# Patient Record
Sex: MALE | Race: BLACK OR AFRICAN AMERICAN | Employment: PART TIME | URBAN - METROPOLITAN AREA
[De-identification: names, ages, dates, MRNs, and addresses within clinical notes are randomized per-mention and may not be internally consistent; named-entity substitution may affect disease eponyms.]

---

## 2020-01-10 ENCOUNTER — HOSPITAL ENCOUNTER (EMERGENCY)
Facility: CLINIC | Age: 45
Discharge: HOME | End: 2020-01-10
Attending: EMERGENCY MEDICINE | Admitting: EMERGENCY MEDICINE
Payer: COMMERCIAL

## 2020-01-10 ENCOUNTER — APPOINTMENT (OUTPATIENT)
Dept: GENERAL RADIOLOGY | Facility: CLINIC | Age: 45
End: 2020-01-10
Attending: EMERGENCY MEDICINE
Payer: COMMERCIAL

## 2020-01-10 VITALS
WEIGHT: 163.8 LBS | RESPIRATION RATE: 16 BRPM | DIASTOLIC BLOOD PRESSURE: 98 MMHG | HEIGHT: 66 IN | BODY MASS INDEX: 26.33 KG/M2 | SYSTOLIC BLOOD PRESSURE: 141 MMHG | TEMPERATURE: 98 F | HEART RATE: 74 BPM | OXYGEN SATURATION: 99 %

## 2020-01-10 DIAGNOSIS — I10 HYPERTENSION, UNSPECIFIED TYPE: ICD-10-CM

## 2020-01-10 LAB
ALBUMIN SERPL-MCNC: 4 G/DL (ref 3.4–5)
ALP SERPL-CCNC: 88 U/L (ref 40–150)
ALT SERPL W P-5'-P-CCNC: 41 U/L (ref 0–70)
ANION GAP SERPL CALCULATED.3IONS-SCNC: 4 MMOL/L (ref 3–14)
AST SERPL W P-5'-P-CCNC: 23 U/L (ref 0–45)
BASOPHILS # BLD AUTO: 0 10E9/L (ref 0–0.2)
BASOPHILS NFR BLD AUTO: 0.4 %
BILIRUB SERPL-MCNC: 0.7 MG/DL (ref 0.2–1.3)
BUN SERPL-MCNC: 10 MG/DL (ref 7–30)
CALCIUM SERPL-MCNC: 8.4 MG/DL (ref 8.5–10.1)
CHLORIDE SERPL-SCNC: 106 MMOL/L (ref 94–109)
CO2 SERPL-SCNC: 30 MMOL/L (ref 20–32)
CREAT SERPL-MCNC: 0.78 MG/DL (ref 0.66–1.25)
DIFFERENTIAL METHOD BLD: NORMAL
EOSINOPHIL # BLD AUTO: 0 10E9/L (ref 0–0.7)
EOSINOPHIL NFR BLD AUTO: 0.6 %
ERYTHROCYTE [DISTWIDTH] IN BLOOD BY AUTOMATED COUNT: 12.8 % (ref 10–15)
GFR SERPL CREATININE-BSD FRML MDRD: >90 ML/MIN/{1.73_M2}
GLUCOSE SERPL-MCNC: 88 MG/DL (ref 70–99)
HCT VFR BLD AUTO: 43.6 % (ref 40–53)
HGB BLD-MCNC: 15.1 G/DL (ref 13.3–17.7)
IMM GRANULOCYTES # BLD: 0 10E9/L (ref 0–0.4)
IMM GRANULOCYTES NFR BLD: 0.2 %
LYMPHOCYTES # BLD AUTO: 1.5 10E9/L (ref 0.8–5.3)
LYMPHOCYTES NFR BLD AUTO: 32.3 %
MCH RBC QN AUTO: 29.5 PG (ref 26.5–33)
MCHC RBC AUTO-ENTMCNC: 34.6 G/DL (ref 31.5–36.5)
MCV RBC AUTO: 85 FL (ref 78–100)
MONOCYTES # BLD AUTO: 0.3 10E9/L (ref 0–1.3)
MONOCYTES NFR BLD AUTO: 6.6 %
NEUTROPHILS # BLD AUTO: 2.8 10E9/L (ref 1.6–8.3)
NEUTROPHILS NFR BLD AUTO: 59.9 %
NRBC # BLD AUTO: 0 10*3/UL
NRBC BLD AUTO-RTO: 0 /100
PLATELET # BLD AUTO: 181 10E9/L (ref 150–450)
POTASSIUM SERPL-SCNC: 4.5 MMOL/L (ref 3.4–5.3)
PROT SERPL-MCNC: 7.4 G/DL (ref 6.8–8.8)
RBC # BLD AUTO: 5.11 10E12/L (ref 4.4–5.9)
SODIUM SERPL-SCNC: 140 MMOL/L (ref 133–144)
TROPONIN I BLD-MCNC: 0 UG/L (ref 0–0.08)
TSH SERPL DL<=0.005 MIU/L-ACNC: 1.05 MU/L (ref 0.4–4)
WBC # BLD AUTO: 4.7 10E9/L (ref 4–11)

## 2020-01-10 PROCEDURE — 71046 X-RAY EXAM CHEST 2 VIEWS: CPT

## 2020-01-10 PROCEDURE — 84484 ASSAY OF TROPONIN QUANT: CPT

## 2020-01-10 PROCEDURE — 93005 ELECTROCARDIOGRAM TRACING: CPT | Performed by: EMERGENCY MEDICINE

## 2020-01-10 PROCEDURE — 99284 EMERGENCY DEPT VISIT MOD MDM: CPT | Mod: Z6 | Performed by: EMERGENCY MEDICINE

## 2020-01-10 PROCEDURE — 85025 COMPLETE CBC W/AUTO DIFF WBC: CPT | Performed by: EMERGENCY MEDICINE

## 2020-01-10 PROCEDURE — 84443 ASSAY THYROID STIM HORMONE: CPT | Performed by: EMERGENCY MEDICINE

## 2020-01-10 PROCEDURE — 99285 EMERGENCY DEPT VISIT HI MDM: CPT | Mod: 25 | Performed by: EMERGENCY MEDICINE

## 2020-01-10 PROCEDURE — 80053 COMPREHEN METABOLIC PANEL: CPT | Performed by: EMERGENCY MEDICINE

## 2020-01-10 SDOH — HEALTH STABILITY: MENTAL HEALTH: HOW OFTEN DO YOU HAVE A DRINK CONTAINING ALCOHOL?: NEVER

## 2020-01-10 ASSESSMENT — ENCOUNTER SYMPTOMS
VOMITING: 0
DIZZINESS: 0
HEADACHES: 0
HYPERTENSION: 1
NAUSEA: 0
CONFUSION: 0
SHORTNESS OF BREATH: 0
WEAKNESS: 0

## 2020-01-10 ASSESSMENT — MIFFLIN-ST. JEOR: SCORE: 1577.8

## 2020-01-10 NOTE — ED TRIAGE NOTES
Seen at clinic 3 days ago. Told to watch BP at home twice daily. Diastolic readings have always been elevated, today diastolic reading at 110. Patient has not started any HTN Rx yet. Would like blood tests.

## 2020-01-10 NOTE — ED PROVIDER NOTES
[facility] EMERGENCY DEPARTMENT ([facility])    1/10/20     ED 2 1:28 PM   History     Chief Complaint   Patient presents with     Hypertension     Seen at clinic 3 days ago. Told to watch BP at home twice daily. Diastolic readings have always been elevated, today diastolic reading at 110. Patient has not started any HTN Rx yet. Would like blood tests.      The history is provided by the patient, medical records and the spouse.     Rohit Bianchi is a 44 year old male who presents with increasing diastolic blood pressures. Patient went to clinic last week and was told that his diastolic pressures have been high. They told patient to trend his blood pressures at home, to change diet to a low sodium diet and exercise. They did not see a doctor, just was seen by clinic staff who checked blood pressure and no labs were done.   Wife has been trending his blood pressures at home over the past few days and noticed his diastolic pressures were 110 at home. She brought him in for evaluation.  Wife states that at home his diastolic pressures have been creeping upwards and this concerned her.  He has not been started on any antihypertension medications. No lightheadedness, dizziness, headache, unilateral weakness. He has some left sided chest discomfort, not sure if this is in chest wall. He notes he sleeps on left side sometimes and can feel achy with this.  No shortness of breath. Patient does not have a primary care doctor, but does have a clinic.     I have reviewed the Medications, Allergies, Past Medical and Surgical History, and Social History in the [employer] system.  History reviewed. No pertinent past medical history.    History reviewed. No pertinent surgical history.    History reviewed. No pertinent family history.    Social History     Tobacco Use     Smoking status: Never Smoker     Smokeless tobacco: Never Used   Substance Use Topics     Alcohol use: Never     Frequency: Never      Review of Systems  "  Respiratory: Negative for shortness of breath.    Cardiovascular: Positive for chest pain (Left chest wall pain). Negative for leg swelling.   Gastrointestinal: Negative for nausea and vomiting.   Neurological: Negative for dizziness, weakness and headaches.   Psychiatric/Behavioral: Negative for confusion.       Physical Exam   BP: (!) 155/96  Pulse: 76  Heart Rate: 74  Temp: 95.8  F (35.4  C)  Resp: 16  Height: 166.4 cm (5' 5.5\")  Weight: 74.3 kg (163 lb 12.8 oz)  SpO2: 98 %      Physical Exam  Vitals signs and nursing note reviewed.   Constitutional:       General: He is not in acute distress.     Appearance: Normal appearance. He is not diaphoretic.   HENT:      Head: Atraumatic.   Eyes:      General: No scleral icterus.     Pupils: Pupils are equal, round, and reactive to light.   Neck:      Musculoskeletal: Normal range of motion and neck supple.   Cardiovascular:      Rate and Rhythm: Normal rate and regular rhythm.      Heart sounds: Normal heart sounds.   Pulmonary:      Effort: No respiratory distress.      Breath sounds: Normal breath sounds.   Abdominal:      General: Bowel sounds are normal.      Palpations: Abdomen is soft.      Tenderness: There is no abdominal tenderness.   Musculoskeletal:         General: No tenderness.   Skin:     General: Skin is warm.      Findings: No rash.   Neurological:      General: No focal deficit present.      Mental Status: He is alert and oriented to person, place, and time.         ED Course     1:28 PM patient assessed in ED 2 by Dr. Scott    Results for orders placed or performed during the hospital encounter of 01/10/20 (from the past 24 hour(s))   EKG 12-lead, tracing only   Result Value Ref Range    Interpretation ECG Click View Image link to view waveform and result    Troponin POCT   Result Value Ref Range    Troponin I 0.00 0.00 - 0.08 ug/L   CBC with platelets differential   Result Value Ref Range    WBC 4.7 4.0 - 11.0 10e9/L    RBC Count 5.11 4.4 - 5.9 " 10e12/L    Hemoglobin 15.1 13.3 - 17.7 g/dL    Hematocrit 43.6 40.0 - 53.0 %    MCV 85 78 - 100 fl    MCH 29.5 26.5 - 33.0 pg    MCHC 34.6 31.5 - 36.5 g/dL    RDW 12.8 10.0 - 15.0 %    Platelet Count 181 150 - 450 10e9/L    Diff Method Automated Method     % Neutrophils 59.9 %    % Lymphocytes 32.3 %    % Monocytes 6.6 %    % Eosinophils 0.6 %    % Basophils 0.4 %    % Immature Granulocytes 0.2 %    Nucleated RBCs 0 0 /100    Absolute Neutrophil 2.8 1.6 - 8.3 10e9/L    Absolute Lymphocytes 1.5 0.8 - 5.3 10e9/L    Absolute Monocytes 0.3 0.0 - 1.3 10e9/L    Absolute Eosinophils 0.0 0.0 - 0.7 10e9/L    Absolute Basophils 0.0 0.0 - 0.2 10e9/L    Abs Immature Granulocytes 0.0 0 - 0.4 10e9/L    Absolute Nucleated RBC 0.0    Comprehensive metabolic panel   Result Value Ref Range    Sodium 140 133 - 144 mmol/L    Potassium 4.5 3.4 - 5.3 mmol/L    Chloride 106 94 - 109 mmol/L    Carbon Dioxide 30 20 - 32 mmol/L    Anion Gap 4 3 - 14 mmol/L    Glucose 88 70 - 99 mg/dL    Urea Nitrogen 10 7 - 30 mg/dL    Creatinine 0.78 0.66 - 1.25 mg/dL    GFR Estimate >90 >60 mL/min/[1.73_m2]    GFR Estimate If Black >90 >60 mL/min/[1.73_m2]    Calcium 8.4 (L) 8.5 - 10.1 mg/dL    Bilirubin Total 0.7 0.2 - 1.3 mg/dL    Albumin 4.0 3.4 - 5.0 g/dL    Protein Total 7.4 6.8 - 8.8 g/dL    Alkaline Phosphatase 88 40 - 150 U/L    ALT 41 0 - 70 U/L    AST 23 0 - 45 U/L   TSH with free T4 reflex   Result Value Ref Range    TSH 1.05 0.40 - 4.00 mU/L   XR Chest 2 Views    Narrative    CHEST TWO VIEWS 1/10/2020 3:10 PM     HISTORY: Hypertension    COMPARISON: None.    FINDINGS: Heart size and pulmonary vascularity are within normal  limits. The lungs are clear. No pneumothorax or pleural effusion.       Impression    IMPRESSION: No radiographic evidence of acute chest abnormality.     ROSA M ASCENCIO MD     Medications - No data to display      Assessments & Plan (with Medical Decision Making)     4 year old male who presents with increasing diastolic  blood pressures.  Patient's blood pressures in the emergency department ranged from 155/96 to 146/101.  IV established, labs drawn sent reviewed document epic essentially all unremarkable including normal CBC, normal electrolytes, normal TSH, negative troponin.  EKG obtained which revealed no acute ischemia and normal sinus rhythm.  Patient given uppercase traction for hypertension and phone number to follow-up with [facility] to establish primary care.    I have reviewed the nursing notes.    I have reviewed the findings, diagnosis, plan and need for follow up with the patient.    New Prescriptions    No medications on file       Final diagnoses:   Hypertension, unspecified type   I, Yue Caldwell, am serving as a trained medical scribe to document services personally performed by Severino Scott MD based on the provider's statements to me on January 10, 2020.  This document has been checked and approved by the attending provider.    I, Severino Scott MD, was physically present and have reviewed and verified the accuracy of this note documented by Yue Caldwell, medical scribe.       1/10/2020   [facility], EMERGENCY DEPARTMENT     Severino Scott MD  01/14/20 0120

## 2020-01-10 NOTE — ED AVS SNAPSHOT
[facility], [place], Emergency Department  [address]  [facility] [place] [place] [address]  Phone:  [phone]  Fax:  [phone]                                    Rohit Bianchi   MRN: [id number]    Department:  [facility], Emergency Department   Date of Visit:  1/10/2020           After Visit Summary Signature Page    I have received my discharge instructions, and my questions have been answered. I have discussed any challenges I see with this plan with the nurse or doctor.    ..........................................................................................................................................  Patient/Patient Representative Signature      ..........................................................................................................................................  Patient Representative Print Name and Relationship to Patient    ..................................................               ................................................  Date                                   Time    ..........................................................................................................................................  Reviewed by Signature/Title    ...................................................              ..............................................  Date                                               Time          22EPIC Rev 08/18

## 2020-01-10 NOTE — ED NOTES
Patient alert/oriented. Stable on feet. AVS reviewed. Patient and spouse denies questions or concerns at discharge. Safe to discharge home.

## 2020-01-10 NOTE — DISCHARGE INSTRUCTIONS
Please make an appointment to follow up with [facility] (phone: [phone]) as soon as possible.

## 2020-01-13 LAB — INTERPRETATION ECG - MUSE: NORMAL

## 2021-04-03 ENCOUNTER — HOSPITAL ENCOUNTER (EMERGENCY)
Facility: CLINIC | Age: 46
Discharge: HOME | End: 2021-04-04
Attending: FAMILY MEDICINE | Admitting: FAMILY MEDICINE
Payer: COMMERCIAL

## 2021-04-03 DIAGNOSIS — H61.21 IMPACTED CERUMEN OF RIGHT EAR: ICD-10-CM

## 2021-04-03 DIAGNOSIS — H65.91 OME (OTITIS MEDIA WITH EFFUSION), RIGHT: ICD-10-CM

## 2021-04-03 PROCEDURE — 99282 EMERGENCY DEPT VISIT SF MDM: CPT | Performed by: FAMILY MEDICINE

## 2021-04-03 PROCEDURE — 99284 EMERGENCY DEPT VISIT MOD MDM: CPT | Performed by: FAMILY MEDICINE

## 2021-04-03 PROCEDURE — 69209 REMOVE IMPACTED EAR WAX UNI: CPT | Mod: RT | Performed by: FAMILY MEDICINE

## 2021-04-03 RX ORDER — CHLORTHALIDONE 25 MG/1
TABLET ORAL
COMMUNITY
Start: 2021-03-25

## 2021-04-04 VITALS
WEIGHT: 165 LBS | SYSTOLIC BLOOD PRESSURE: 128 MMHG | RESPIRATION RATE: 18 BRPM | HEART RATE: 85 BPM | DIASTOLIC BLOOD PRESSURE: 64 MMHG | TEMPERATURE: 98.8 F | BODY MASS INDEX: 27.04 KG/M2 | OXYGEN SATURATION: 100 %

## 2021-04-04 RX ORDER — AZITHROMYCIN 250 MG/1
TABLET, FILM COATED ORAL
Qty: 6 TABLET | Refills: 0 | Status: SHIPPED | OUTPATIENT
Start: 2021-04-04

## 2021-04-04 ASSESSMENT — ENCOUNTER SYMPTOMS
DECREASED CONCENTRATION: 0
DYSPHORIC MOOD: 0
ABDOMINAL PAIN: 0
HEADACHES: 0
NAUSEA: 0
CONFUSION: 0
SHORTNESS OF BREATH: 0
APPETITE CHANGE: 0
TROUBLE SWALLOWING: 0
ACTIVITY CHANGE: 0
FEVER: 0
BRUISES/BLEEDS EASILY: 0
WEAKNESS: 0
VOMITING: 0
SINUS PRESSURE: 0
NUMBNESS: 0

## 2021-04-04 NOTE — DISCHARGE INSTRUCTIONS
Home.  Take the zpak for infection.  Use the Debrox drops twice a day for next week to loosen wax.  REcheck with MD if not improving.  Return if any concerns.

## 2021-04-04 NOTE — ED PROVIDER NOTES
[facility] EMERGENCY DEPARTMENT ([facility])    4/03/21     [address]   History     Chief Complaint   Patient presents with     Otalgia     bilateral but worse on right     HPI  Rohit Bianchi is a 45 year old male who presents with bilateral ear discomfort and muffled hearing.  Patient noted last few days having increasing muffled hearing mild dizziness ear pain with some questionable irritation of canal on the right.  No fevers or chills no significant sinus symptoms.  No ringing in the ears no headache etc.  No previous history of cerumen impaction or foreign body.      Past Medical History  History reviewed. No pertinent past medical history.  History reviewed. No pertinent surgical history.  azithromycin (ZITHROMAX Z-ALEX) 250 MG tablet  carbamide peroxide (DEBROX) 6.5 % otic solution  chlorthalidone (HYGROTON) 25 MG tablet      No Known Allergies  Family History  History reviewed. No pertinent family history.  Social History   Social History     Tobacco Use     Smoking status: Never Smoker     Smokeless tobacco: Never Used   Substance Use Topics     Alcohol use: Never     Frequency: Never     Drug use: Never      Past medical history, past surgical history, medications, allergies, family history, and social history were reviewed with the patient. No additional pertinent items.       Review of Systems   Constitutional: Negative for activity change, appetite change and fever.   HENT: Positive for ear pain and hearing loss. Negative for dental problem, ear discharge, mouth sores, sinus pressure and trouble swallowing.    Eyes: Negative for visual disturbance.   Respiratory: Negative for shortness of breath.    Cardiovascular: Negative for chest pain.   Gastrointestinal: Negative for abdominal pain, nausea and vomiting.   Musculoskeletal: Negative for gait problem.   Skin: Negative for rash.   Allergic/Immunologic: Negative for immunocompromised state.   Neurological: Negative for weakness, numbness and  headaches.   Hematological: Does not bruise/bleed easily.   Psychiatric/Behavioral: Negative for confusion, decreased concentration and dysphoric mood.   All other systems reviewed and are negative.    A complete review of systems was performed with pertinent positives and negatives noted in the HPI, and all other systems negative.    Physical Exam   BP: 132/82  Pulse: 84  Temp: 98.8  F (37.1  C)  Resp: 12  Weight: 74.8 kg (165 lb)  SpO2: 96 %  Physical Exam  Vitals signs and nursing note reviewed.   Constitutional:       General: He is in acute distress.      Appearance: He is well-developed. He is not toxic-appearing or diaphoretic.      Comments: Patient nontoxic wife present   HENT:      Head: Normocephalic and atraumatic.      Ears:      Comments: Patient evaluation the right ear canal with some debris noted in it.  Left ears normal.     Nose: No congestion.      Mouth/Throat:      Mouth: Mucous membranes are moist.   Eyes:      General: No scleral icterus.     Extraocular Movements: Extraocular movements intact.      Conjunctiva/sclera: Conjunctivae normal.      Pupils: Pupils are equal, round, and reactive to light.   Neck:      Musculoskeletal: Normal range of motion and neck supple.   Cardiovascular:      Rate and Rhythm: Normal rate.   Pulmonary:      Effort: No respiratory distress.   Abdominal:      Tenderness: There is no guarding.   Musculoskeletal:         General: No swelling.   Lymphadenopathy:      Cervical: No cervical adenopathy.   Skin:     General: Skin is warm and dry.      Capillary Refill: Capillary refill takes less than 2 seconds.      Findings: No rash.   Neurological:      General: No focal deficit present.      Mental Status: He is alert and oriented to person, place, and time. Mental status is at baseline.   Psychiatric:      Comments: Appropriate           ED Course         Patient valuated here in the ER.  Patient had irrigation of the right ear.  Reassessed at this point TM appears  somewhat erythematous concerning for mild infection.  There was no insects or anything else noted in the debris with flushing.  There is still some residual wax in the right ear.  Patient feeling better at this point.  Recommendations are to continue to use over-the-counter Debrox for cerumen disimpaction along with treating for potential otitis media with Z-Alex and follow-up with MD return to concerns patient agrees with plan of discharge.      Procedures               No results found for any visits on 04/03/21.  Medications - No data to display     Assessments & Plan (with Medical Decision Making)  44-year-old male presents ER with right ear fullness the last few days.  Some dizziness not vertigo neurologically intact appears nontoxic his right ear canal there appeared to be some debris I did know if there was potentially a foreign body or insect etc. this was irrigated still some residual cerumen patient feeling better mild erythema of the TM will treat for mild otitis along with cerumen impaction and discharged with findings as noted below.  Should return if any concerns at all follow-up with MD.         I have reviewed the nursing notes. I have reviewed the findings, diagnosis, plan and need for follow up with the patient.    Discharge Medication List as of 4/4/2021 12:21 AM      START taking these medications    Details   azithromycin (ZITHROMAX Z-ALEX) 250 MG tablet Take as directed for infection., Disp-6 tablet, R-0, Local Print      carbamide peroxide (DEBROX) 6.5 % otic solution Place 5 drops into the right ear 2 times daily For the next week to help with wax removal., Disp-15 mL, R-0, Local Print             Final diagnoses:   OME (otitis media with effusion), right   Impacted cerumen of right ear       --  Teo Galeano    [facility] EMERGENCY DEPARTMENT  4/3/2021      This note was created at least in part by the use of dragon voice dictation system. Inadvertent typographical errors may  still exist.  Teo Galeano MD.    Patient evaluated in the emergency department during the COVID-19 pandemic period. Careful attention to patients safety was addressed throughout the evaluation. Evaluation and treatment management was initiated with disposition made efficiently and appropriate as possible to minimize any risk of potential exposure to patient during this evaluation.       Teo Galeano MD  04/04/21 1959

## 2021-04-04 NOTE — ED TRIAGE NOTES
Bilateral ear pain (more on right) with increased sensation of fullness; pain present for 1 month and worse the last three nights. A little off balance